# Patient Record
Sex: MALE | Race: WHITE | NOT HISPANIC OR LATINO | URBAN - METROPOLITAN AREA
[De-identification: names, ages, dates, MRNs, and addresses within clinical notes are randomized per-mention and may not be internally consistent; named-entity substitution may affect disease eponyms.]

---

## 2020-12-08 ENCOUNTER — LAB OUTSIDE AN ENCOUNTER (OUTPATIENT)
Dept: URBAN - METROPOLITAN AREA CLINIC 121 | Facility: CLINIC | Age: 55
End: 2020-12-08

## 2020-12-08 LAB
A/G RATIO: (no result)
ALBUMIN: 4.5
ALK PHOS: 55
B-12: 316
BASOPH COUNT: (no result)
BASOPHIL %: 0.4
BG RANDOM: 86
BILI TOTAL: 0.5
BUN/CREAT: (no result)
BUN: 15
CALCIUM: 9.4
CHLORIDE: 103
CHOL/HDL %: (no result)
CO2: 28
CREATININE: 0.91
EOS COUNT: (no result)
EOSINOPHIL %: 1.3
FERRITIN: 350
GLOBULIN TOT: (no result)
HCT: 43.7
HDL: (no result)
HGB: 14.8
LYMPHS %: 42
MCH: 31.1
MCHC: (no result)
MCV: 91.8
MONOCYTE %: 14.3
MONOSCT AUTO: (no result)
PLATELETS: (no result)
PMN %: 42
POTASSIUM: 4.4
PROTEIN, TOT: 7.2
RBC: (no result)
RDW: 13.1
SGOT (AST): 16
SGPT (ALT): 16
TRIGLYC TOT: 125
WBC: (no result)
ZZ-GE-UNK: (no result)

## 2021-07-02 ENCOUNTER — LAB OUTSIDE AN ENCOUNTER (OUTPATIENT)
Dept: URBAN - METROPOLITAN AREA CLINIC 121 | Facility: CLINIC | Age: 56
End: 2021-07-02

## 2021-07-02 LAB — ZZ-GE-UNK: NEGATIVE

## 2021-07-15 ENCOUNTER — LAB OUTSIDE AN ENCOUNTER (OUTPATIENT)
Dept: URBAN - METROPOLITAN AREA CLINIC 121 | Facility: CLINIC | Age: 56
End: 2021-07-15

## 2021-07-15 LAB
A/G RATIO: (no result)
ALBUMIN: 4.7
ALK PHOS: 54
ANTI-HAV: REACTIVE
B-12: 325
BASOPH COUNT: (no result)
BASOPHIL %: 0.4
BG RANDOM: 86
BILI TOTAL: 0.5
BUN/CREAT: (no result)
BUN: 15
CALCIUM: 9.6
CHLORIDE: 101
CHOL/HDL %: (no result)
CO2: 29
CREATININE: 1.09
EOS COUNT: (no result)
EOSINOPHIL %: 1.7
FERRITIN: 354
FOLATE: 10.6
GLOBULIN TOT: (no result)
HCT: 44
HDL: (no result)
HGB: 14.7
IRON SATUR %: (no result)
IRON: 113
LYMPHS %: 44.8
MCH: 30.9
MCHC: (no result)
MCV: 92.4
MONOCYTE %: 11.8
MONOSCT AUTO: (no result)
PLATELETS: (no result)
PMN %: 41.3
POTASSIUM: 4.1
PROTEIN, TOT: 7.5
RBC: (no result)
RDW: 13.1
RETIC COUNT: 1.1
SGOT (AST): 16
SGPT (ALT): 18
TIBC: (no result)
TRIGLYC TOT: 111
TSH: 2.59
WBC: (no result)
ZZ-GE-UNK: (no result)
ZZ-GE-UNK: REACTIVE

## 2022-04-30 ENCOUNTER — TELEPHONE ENCOUNTER (OUTPATIENT)
Dept: URBAN - METROPOLITAN AREA CLINIC 121 | Facility: CLINIC | Age: 57
End: 2022-04-30

## 2022-04-30 RX ORDER — TRAMADOL HYDROCHLORIDE 50 MG/1
TAKE 1 TABLET PO Q8H PRN PAIN TABLET, FILM COATED ORAL
OUTPATIENT
Start: 2019-09-26

## 2022-04-30 RX ORDER — DUTASTERIDE 0.5 MG/1
1 CAPSULE PO QD CAPSULE, LIQUID FILLED ORAL
OUTPATIENT
Start: 2018-06-07

## 2022-04-30 RX ORDER — DUTASTERIDE AND TAMSULOSIN HYDROCHLORIDE .5; .4 MG/1; MG/1
TAKE 1 CAPSULE PO QD CAPSULE ORAL
OUTPATIENT
Start: 2017-10-13

## 2022-04-30 RX ORDER — OMEPRAZOLE 20 MG/1
1 CAPSULE BY MOUTH TWICE A DAY CAPSULE, DELAYED RELEASE ORAL
OUTPATIENT
Start: 2013-05-28 | End: 2022-02-28

## 2022-04-30 RX ORDER — CIPROFLOXACIN HCL 500 MG
TAKE 1 TABLET PO BID FOR 14 DAYS TABLET ORAL
OUTPATIENT
Start: 2018-06-18

## 2022-04-30 RX ORDER — ROSUVASTATIN CALCIUM 20 MG
1 TABLET PO QD TABLET ORAL
OUTPATIENT
Start: 2018-05-30

## 2022-04-30 RX ORDER — OMEPRAZOLE 20 MG/1
1 CAPSULE PO BID CAPSULE, DELAYED RELEASE ORAL
OUTPATIENT
Start: 2013-05-28

## 2022-04-30 RX ORDER — RAMIPRIL 10 MG/1
TAKE 2 CAPSULE BY MOUTH ONCE A DAY CAPSULE ORAL
OUTPATIENT
Start: 2013-05-28 | End: 2022-02-28

## 2022-04-30 RX ORDER — DEXAMETHASONE 6 MG/1
PO QD TABLET ORAL
OUTPATIENT
Start: 2021-01-22

## 2022-04-30 RX ORDER — FLUCONAZOLE 150 MG/1
1 TABLET PO QD FOR 10 DAYS TABLET ORAL
OUTPATIENT
Start: 2018-05-30

## 2022-04-30 RX ORDER — ROSUVASTATIN CALCIUM 20 MG
1 TABLET BY MOUTH ONCE A DAY TABLET ORAL
OUTPATIENT
Start: 2018-05-30 | End: 2022-02-28

## 2022-04-30 RX ORDER — DUTASTERIDE 0.5 MG/1
1 CAPSULE BY MOUTH ONCE A DAY CAPSULE, LIQUID FILLED ORAL
OUTPATIENT
Start: 2018-06-07 | End: 2022-02-28

## 2022-04-30 RX ORDER — CYANOCOBALAMIN 1000 UG/ML
INJECT SUBQ ONCE A WEEK X 10 WEEKS, PLEASE ADD ON SYRINGES AND NEEDLES INJECTION INTRAMUSCULAR; SUBCUTANEOUS
OUTPATIENT
Start: 2020-12-16

## 2022-04-30 RX ORDER — RAMIPRIL 10 MG/1
TAKE  1 CAPSULE BY MOUTH DAILY CAPSULE ORAL
OUTPATIENT
Start: 2013-05-28

## 2022-05-01 ENCOUNTER — TELEPHONE ENCOUNTER (OUTPATIENT)
Dept: URBAN - METROPOLITAN AREA CLINIC 121 | Facility: CLINIC | Age: 57
End: 2022-05-01

## 2022-05-01 RX ORDER — OMEPRAZOLE 20 MG/1
TAKE 1 CAPSULE BY MOUTH TWICE A DAY CAPSULE, DELAYED RELEASE ORAL
Status: ACTIVE | COMMUNITY
Start: 2022-04-26 | End: 2023-04-21

## 2022-05-01 RX ORDER — CIPROFLOXACIN HCL 500 MG
TAKE 1 TABLET BY MOUTH TWICE A DAY TABLET ORAL
Status: ACTIVE | COMMUNITY
Start: 2018-06-18

## 2022-05-01 RX ORDER — ROSUVASTATIN CALCIUM 20 MG/1
TAKE 1 TABLET BY MOUTH ONCE A DAY TABLET, FILM COATED ORAL
Status: ACTIVE | COMMUNITY
Start: 2022-04-26 | End: 2023-04-21

## 2022-05-01 RX ORDER — DUTASTERIDE AND TAMSULOSIN HYDROCHLORIDE .5; .4 MG/1; MG/1
TAKE 1 CAPSULE BY MOUTH ONCE A DAY CAPSULE ORAL
Status: ACTIVE | COMMUNITY
Start: 2017-10-13

## 2022-05-01 RX ORDER — CYANOCOBALAMIN 1000 UG/ML
INJECT ML SUBCUTANEOUSLY ONCE A WEEK INJECTION INTRAMUSCULAR; SUBCUTANEOUS
Status: ACTIVE | COMMUNITY
Start: 2020-12-16

## 2022-05-01 RX ORDER — BENZONATATE 100 MG
TAKE 1 CAPSULE BY MOUTH THREE TIMES A DAY AS NEEDED TAKE ONE OR TWO CAPSULES BY MOUTH EVERY 8HRS AS NEEDED FOR COUGH CAPSULE ORAL
Status: ACTIVE | COMMUNITY
Start: 2022-01-12

## 2022-05-01 RX ORDER — TRAMADOL HYDROCHLORIDE 50 MG/1
TAKE 1 TABLET BY MOUTH EVERY EIGHT HOURS AS NEEDED TABLET, FILM COATED ORAL
Status: ACTIVE | COMMUNITY
Start: 2019-09-26

## 2022-05-01 RX ORDER — DEXAMETHASONE 6 MG/1
TABLET BY MOUTH ONCE A DAY TABLET ORAL
Status: ACTIVE | COMMUNITY
Start: 2021-01-22

## 2022-05-01 RX ORDER — RAMIPRIL 10 MG/1
TAKE 2 CAPSULE BY MOUTH ONCE A DAY CAPSULE ORAL
Status: ACTIVE | COMMUNITY
Start: 2022-04-26 | End: 2023-04-21

## 2022-05-01 RX ORDER — FLUCONAZOLE 150 MG/1
1 TABLET BY MOUTH ONCE A DAY TABLET ORAL
Status: ACTIVE | COMMUNITY
Start: 2018-05-30

## 2022-05-01 RX ORDER — DUTASTERIDE 0.5 MG/1
TAKE 1 CAPSULE BY MOUTH ONCE A DAY CAPSULE, LIQUID FILLED ORAL
Status: ACTIVE | COMMUNITY
Start: 2022-04-26 | End: 2023-04-21

## 2022-05-23 ENCOUNTER — TELEPHONE ENCOUNTER (OUTPATIENT)
Dept: URBAN - METROPOLITAN AREA CLINIC 27 | Facility: CLINIC | Age: 57
End: 2022-05-23

## 2022-05-23 ENCOUNTER — LAB OUTSIDE AN ENCOUNTER (OUTPATIENT)
Dept: URBAN - METROPOLITAN AREA CLINIC 27 | Facility: CLINIC | Age: 57
End: 2022-05-23

## 2022-05-23 PROBLEM — 235595009 GASTROESOPHAGEAL REFLUX DISEASE: Status: ACTIVE | Noted: 2022-05-23

## 2022-05-23 PROBLEM — 40739000 DYSPHAGIA: Status: ACTIVE | Noted: 2022-05-23

## 2022-05-23 PROBLEM — 429975007 ORAL PHASE DYSPHAGIA: Status: ACTIVE | Noted: 2022-05-23

## 2022-05-23 NOTE — HPI-TODAY'S VISIT:
Pt having persistent reflux symptoms and dysphagia that have not responded to OTC therapy. No odynophagia or unintentional weight loss. No prior EGD. No FHx esophageal cancer. Will arrange EGD to r/o reflux/eosinophilic esophagitis. Will likely perform esophageal dilation during that exam. Proper mastication, small bites, antireflux regimen, OTC antacid therapy.

## 2022-05-26 ENCOUNTER — CLAIMS CREATED FROM THE CLAIM WINDOW (OUTPATIENT)
Dept: URBAN - METROPOLITAN AREA SURGERY CENTER 7 | Facility: SURGERY CENTER | Age: 57
End: 2022-05-26
Payer: COMMERCIAL

## 2022-05-26 ENCOUNTER — CLAIMS CREATED FROM THE CLAIM WINDOW (OUTPATIENT)
Dept: URBAN - METROPOLITAN AREA CLINIC 4 | Facility: CLINIC | Age: 57
End: 2022-05-26
Payer: COMMERCIAL

## 2022-05-26 DIAGNOSIS — K31.89 ACQUIRED DEFORMITY OF DUODENUM: ICD-10-CM

## 2022-05-26 DIAGNOSIS — Q39.4 CONGENITAL WEB OF ESOPHAGUS: ICD-10-CM

## 2022-05-26 DIAGNOSIS — R12 BURNING REFLUX: ICD-10-CM

## 2022-05-26 DIAGNOSIS — K21.9 GASTRO-ESOPHAGEAL REFLUX DISEASE WITHOUT ESOPHAGITIS: ICD-10-CM

## 2022-05-26 DIAGNOSIS — R13.14 CRICOPHARYNGEAL DISORDER: ICD-10-CM

## 2022-05-26 DIAGNOSIS — K63.89 CYST OF DUODENUM: ICD-10-CM

## 2022-05-26 PROCEDURE — 88342 IMHCHEM/IMCYTCHM 1ST ANTB: CPT | Performed by: PATHOLOGY

## 2022-05-26 PROCEDURE — 43450 DILATE ESOPHAGUS 1/MULT PASS: CPT | Performed by: INTERNAL MEDICINE

## 2022-05-26 PROCEDURE — 88312 SPECIAL STAINS GROUP 1: CPT | Performed by: PATHOLOGY

## 2022-05-26 PROCEDURE — 43239 EGD BIOPSY SINGLE/MULTIPLE: CPT | Performed by: INTERNAL MEDICINE

## 2022-05-26 PROCEDURE — G8907 PT DOC NO EVENTS ON DISCHARG: HCPCS | Performed by: INTERNAL MEDICINE

## 2022-05-26 PROCEDURE — 88341 IMHCHEM/IMCYTCHM EA ADD ANTB: CPT | Performed by: PATHOLOGY

## 2022-05-26 PROCEDURE — 88305 TISSUE EXAM BY PATHOLOGIST: CPT | Performed by: PATHOLOGY

## 2022-05-26 RX ORDER — CIPROFLOXACIN HCL 500 MG
TAKE 1 TABLET BY MOUTH TWICE A DAY TABLET ORAL
Status: ACTIVE | COMMUNITY
Start: 2018-06-18

## 2022-05-26 RX ORDER — RAMIPRIL 10 MG/1
TAKE 2 CAPSULE BY MOUTH ONCE A DAY CAPSULE ORAL
Status: ACTIVE | COMMUNITY
Start: 2022-04-26 | End: 2023-04-21

## 2022-05-26 RX ORDER — FLUCONAZOLE 150 MG/1
1 TABLET BY MOUTH ONCE A DAY TABLET ORAL
Status: ACTIVE | COMMUNITY
Start: 2018-05-30

## 2022-05-26 RX ORDER — DEXAMETHASONE 6 MG/1
TABLET BY MOUTH ONCE A DAY TABLET ORAL
Status: ACTIVE | COMMUNITY
Start: 2021-01-22

## 2022-05-26 RX ORDER — OMEPRAZOLE 20 MG/1
TAKE 1 CAPSULE BY MOUTH TWICE A DAY CAPSULE, DELAYED RELEASE ORAL
Status: ACTIVE | COMMUNITY
Start: 2022-04-26 | End: 2023-04-21

## 2022-05-26 RX ORDER — CYANOCOBALAMIN 1000 UG/ML
INJECT ML SUBCUTANEOUSLY ONCE A WEEK INJECTION INTRAMUSCULAR; SUBCUTANEOUS
Status: ACTIVE | COMMUNITY
Start: 2020-12-16

## 2022-05-26 RX ORDER — DUTASTERIDE 0.5 MG/1
TAKE 1 CAPSULE BY MOUTH ONCE A DAY CAPSULE, LIQUID FILLED ORAL
Status: ACTIVE | COMMUNITY
Start: 2022-04-26 | End: 2023-04-21

## 2022-05-26 RX ORDER — ROSUVASTATIN CALCIUM 20 MG/1
TAKE 1 TABLET BY MOUTH ONCE A DAY TABLET, FILM COATED ORAL
Status: ACTIVE | COMMUNITY
Start: 2022-04-26 | End: 2023-04-21

## 2022-05-26 RX ORDER — DUTASTERIDE AND TAMSULOSIN HYDROCHLORIDE .5; .4 MG/1; MG/1
TAKE 1 CAPSULE BY MOUTH ONCE A DAY CAPSULE ORAL
Status: ACTIVE | COMMUNITY
Start: 2017-10-13

## 2022-05-26 RX ORDER — BENZONATATE 100 MG
TAKE 1 CAPSULE BY MOUTH THREE TIMES A DAY AS NEEDED TAKE ONE OR TWO CAPSULES BY MOUTH EVERY 8HRS AS NEEDED FOR COUGH CAPSULE ORAL
Status: ACTIVE | COMMUNITY
Start: 2022-01-12

## 2022-05-26 RX ORDER — TRAMADOL HYDROCHLORIDE 50 MG/1
TAKE 1 TABLET BY MOUTH EVERY EIGHT HOURS AS NEEDED TABLET, FILM COATED ORAL
Status: ACTIVE | COMMUNITY
Start: 2019-09-26

## 2022-11-07 ENCOUNTER — TELEPHONE ENCOUNTER (OUTPATIENT)
Dept: URBAN - METROPOLITAN AREA CLINIC 27 | Facility: CLINIC | Age: 57
End: 2022-11-07

## 2022-11-07 RX ORDER — OMEPRAZOLE 20 MG/1
TAKE 1 CAPSULE BY MOUTH TWICE A DAY CAPSULE, DELAYED RELEASE ORAL ONCE A DAY
Qty: 180 | Refills: 2
Start: 2022-04-26

## 2022-11-07 RX ORDER — RAMIPRIL 10 MG/1
TAKE 2 CAPSULE BY MOUTH ONCE A DAY CAPSULE ORAL ONCE A DAY
Qty: 180 | Refills: 2
Start: 2022-04-26

## 2023-02-28 ENCOUNTER — TELEPHONE ENCOUNTER (OUTPATIENT)
Dept: URBAN - METROPOLITAN AREA CLINIC 27 | Facility: CLINIC | Age: 58
End: 2023-02-28

## 2023-02-28 RX ORDER — OMEPRAZOLE 20 MG/1
TAKE 1 CAPSULE BY MOUTH TWICE A DAY CAPSULE, DELAYED RELEASE ORAL ONCE A DAY
Qty: 180 | Refills: 2
Start: 2022-04-26

## 2023-02-28 RX ORDER — DUTASTERIDE AND TAMSULOSIN HYDROCHLORIDE .5; .4 MG/1; MG/1
TAKE 1 CAPSULE BY MOUTH ONCE A DAY CAPSULE ORAL
Qty: 90 | Refills: 2
Start: 2017-10-13 | End: 2023-05-29

## 2023-02-28 RX ORDER — ROSUVASTATIN CALCIUM 20 MG/1
TAKE 1 TABLET BY MOUTH ONCE A DAY TABLET, FILM COATED ORAL
Qty: 90 | Refills: 2
Start: 2022-04-26

## 2023-02-28 RX ORDER — RAMIPRIL 10 MG/1
TAKE 1 CAPSULE BY MOUTH ONCE A DAY CAPSULE ORAL ONCE A DAY
Qty: 90 | Refills: 2
Start: 2022-04-26

## 2023-03-03 ENCOUNTER — TELEPHONE ENCOUNTER (OUTPATIENT)
Dept: URBAN - METROPOLITAN AREA CLINIC 27 | Facility: CLINIC | Age: 58
End: 2023-03-03

## 2023-03-03 RX ORDER — OMEPRAZOLE 20 MG/1
TAKE 1 CAPSULE BY MOUTH TWICE A DAY CAPSULE, DELAYED RELEASE ORAL ONCE A DAY
Qty: 180 | Refills: 2
Start: 2022-04-26

## 2023-03-03 RX ORDER — ROSUVASTATIN CALCIUM 20 MG/1
TAKE 1 TABLET BY MOUTH ONCE A DAY TABLET, FILM COATED ORAL ONCE A DAY
Qty: 90 | Refills: 2
Start: 2022-04-26

## 2023-03-08 ENCOUNTER — TELEPHONE ENCOUNTER (OUTPATIENT)
Dept: URBAN - METROPOLITAN AREA CLINIC 27 | Facility: CLINIC | Age: 58
End: 2023-03-08

## 2023-03-08 RX ORDER — OMEPRAZOLE 20 MG/1
TAKE 1 CAPSULE CAPSULE, DELAYED RELEASE ORAL TWICE A DAY
Qty: 180 | Refills: 2
Start: 2022-04-26

## 2023-03-10 ENCOUNTER — TELEPHONE ENCOUNTER (OUTPATIENT)
Dept: URBAN - METROPOLITAN AREA CLINIC 27 | Facility: CLINIC | Age: 58
End: 2023-03-10

## 2023-03-30 ENCOUNTER — TELEPHONE ENCOUNTER (OUTPATIENT)
Dept: URBAN - METROPOLITAN AREA CLINIC 27 | Facility: CLINIC | Age: 58
End: 2023-03-30

## 2023-03-30 RX ORDER — RAMIPRIL 10 MG/1
TAKE 2 CAPSULE BY MOUTH ONCE A DAY CAPSULE ORAL ONCE A DAY
Qty: 180 | Refills: 2
Start: 2022-04-26

## 2023-04-10 ENCOUNTER — TELEPHONE ENCOUNTER (OUTPATIENT)
Dept: URBAN - METROPOLITAN AREA CLINIC 27 | Facility: CLINIC | Age: 58
End: 2023-04-10

## 2023-04-10 RX ORDER — ESZOPICLONE 2 MG/1
1 TABLET IMMEDIATELY BEFORE BEDTIME TABLET, FILM COATED ORAL ONCE A DAY
Qty: 30 TABLET | Refills: 2 | OUTPATIENT
Start: 2023-04-10

## 2025-01-07 ENCOUNTER — TELEPHONE ENCOUNTER (OUTPATIENT)
Dept: URBAN - METROPOLITAN AREA CLINIC 27 | Facility: CLINIC | Age: 60
End: 2025-01-07

## 2025-06-18 ENCOUNTER — ERX REFILL RESPONSE (OUTPATIENT)
Dept: URBAN - METROPOLITAN AREA CLINIC 27 | Facility: CLINIC | Age: 60
End: 2025-06-18

## 2025-06-18 RX ORDER — ROSUVASTATIN CALCIUM 20 MG/1
TAKE 1 TABLET ONCE DAILY TABLET, FILM COATED ORAL
Qty: 90 TABLET | Refills: 3 | OUTPATIENT

## 2025-06-18 RX ORDER — ROSUVASTATIN CALCIUM 20 MG/1
TAKE 1 TABLET DAILY TABLET, FILM COATED ORAL
Qty: 90 TABLET | Refills: 3 | OUTPATIENT

## 2025-08-20 ENCOUNTER — P2P PATIENT RECORD (OUTPATIENT)
Age: 60
End: 2025-08-20